# Patient Record
Sex: FEMALE | Race: WHITE
[De-identification: names, ages, dates, MRNs, and addresses within clinical notes are randomized per-mention and may not be internally consistent; named-entity substitution may affect disease eponyms.]

---

## 2017-11-02 ENCOUNTER — HOSPITAL ENCOUNTER (OUTPATIENT)
Dept: HOSPITAL 92 - RAD-FRANK | Age: 82
Discharge: HOME | End: 2017-11-02
Attending: NURSE PRACTITIONER
Payer: MEDICARE

## 2017-11-02 DIAGNOSIS — J40: Primary | ICD-10-CM

## 2017-11-02 DIAGNOSIS — R91.8: ICD-10-CM

## 2017-11-02 PROCEDURE — 71010: CPT

## 2017-11-02 NOTE — RAD
FRONTAL VIEW CHEST

11/2/17

 

COMPARISON:  

8/18/17

 

INDICATION:

Cough.

 

FINDINGS:  

Lungs are hyperinflated. There is no consolidation. There is minimal blunting of the left lateral co
stophrenic sulcus. Otherwise, no significant interval detrimental change identified. 

 

IMPRESSION:  

Slight blunting of the left lateral costophrenic sulcus. This could be on the basis of pleural thick
ening or minimal pleural fluid. Followup may be obtained with dedicated two view chest as necessary.
 

 

POS: [facility]

## 2018-01-05 ENCOUNTER — HOSPITAL ENCOUNTER (OUTPATIENT)
Dept: HOSPITAL 92 - RAD-FRANK | Age: 83
Discharge: HOME | End: 2018-01-05
Attending: NURSE PRACTITIONER
Payer: MEDICARE

## 2018-01-05 DIAGNOSIS — M19.011: ICD-10-CM

## 2018-01-05 DIAGNOSIS — M19.012: ICD-10-CM

## 2018-01-05 DIAGNOSIS — J40: Primary | ICD-10-CM

## 2018-01-05 DIAGNOSIS — I70.0: ICD-10-CM

## 2018-01-05 PROCEDURE — 71046 X-RAY EXAM CHEST 2 VIEWS: CPT

## 2018-01-05 NOTE — RAD
CHEST PA AND LATERAL:

 

HISTORY: 

An 89-year-old female with cough.

 

FINDINGS: 

Heart size is within normal limits.  There is blunting of both costophrenic angles, evidence for some
 small pleural effusions.  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
robable left-sided synovial osteochondromas and right-sided calcific peritendinosis.  Atherosclerosis
 of the aorta with ectasia.  Biapical pleural thickening.  No confluent pneumonia or overt edema.

 

IMPRESSION: 

Probable small pleural effusions.  Atherosclerosis of the aorta with ectasia.  No confluent pneumonia
.  Degenerative-type changes of both shoulders.

 

POS: [facility]

## 2018-03-07 ENCOUNTER — HOSPITAL ENCOUNTER (OUTPATIENT)
Dept: HOSPITAL 92 - RAD-FRANK | Age: 83
Discharge: HOME | End: 2018-03-07
Attending: NURSE PRACTITIONER
Payer: MEDICARE

## 2018-03-07 DIAGNOSIS — M54.5: Primary | ICD-10-CM

## 2018-03-07 PROCEDURE — 72100 X-RAY EXAM L-S SPINE 2/3 VWS: CPT

## 2018-03-07 NOTE — RAD
LUMBAR SPINE SERIES THREE VIEWS:

 

History: Low back pain. 

 

FINDINGS:

Bones are demineralized. There are compression changes involving the superior endplate of T12, the yuan
perior and inferior endplates of L4 and L5. In reviewing  film from a previous CT examination do
ne in 2007, the compression changes of the L4 were present. L5 and T12 compression changes do not valentine
ear to have been present at that time. The bones are demineralized. There are moderate degenerative f
acet changes. Pedicles are intact. 

 

IMPRESSION: 

Multilevel compression changes which appear to be on the basis of osteoporosis. Age of these are inde
terminate. There is diffuse bony demineralization noted. 

 

POS: [facility]